# Patient Record
Sex: MALE | Race: OTHER | HISPANIC OR LATINO
[De-identification: names, ages, dates, MRNs, and addresses within clinical notes are randomized per-mention and may not be internally consistent; named-entity substitution may affect disease eponyms.]

---

## 2022-03-18 ENCOUNTER — LABORATORY RESULT (OUTPATIENT)
Age: 67
End: 2022-03-18

## 2022-03-18 ENCOUNTER — APPOINTMENT (OUTPATIENT)
Dept: INTERNAL MEDICINE | Facility: CLINIC | Age: 67
End: 2022-03-18
Payer: COMMERCIAL

## 2022-03-18 ENCOUNTER — OUTPATIENT (OUTPATIENT)
Dept: OUTPATIENT SERVICES | Facility: HOSPITAL | Age: 67
LOS: 1 days | End: 2022-03-18
Payer: SELF-PAY

## 2022-03-18 ENCOUNTER — RESULT REVIEW (OUTPATIENT)
Age: 67
End: 2022-03-18

## 2022-03-18 VITALS
BODY MASS INDEX: 27.61 KG/M2 | SYSTOLIC BLOOD PRESSURE: 110 MMHG | DIASTOLIC BLOOD PRESSURE: 70 MMHG | HEIGHT: 70.5 IN | OXYGEN SATURATION: 98 % | HEART RATE: 94 BPM | WEIGHT: 195 LBS

## 2022-03-18 VITALS
BODY MASS INDEX: 27.61 KG/M2 | SYSTOLIC BLOOD PRESSURE: 110 MMHG | HEART RATE: 94 BPM | OXYGEN SATURATION: 98 % | WEIGHT: 195 LBS | HEIGHT: 70.5 IN | DIASTOLIC BLOOD PRESSURE: 70 MMHG

## 2022-03-18 DIAGNOSIS — Z82.49 FAMILY HISTORY OF ISCHEMIC HEART DISEASE AND OTHER DISEASES OF THE CIRCULATORY SYSTEM: ICD-10-CM

## 2022-03-18 DIAGNOSIS — I10 ESSENTIAL (PRIMARY) HYPERTENSION: ICD-10-CM

## 2022-03-18 DIAGNOSIS — Z86.010 PERSONAL HISTORY OF COLONIC POLYPS: ICD-10-CM

## 2022-03-18 DIAGNOSIS — R00.2 PALPITATIONS: ICD-10-CM

## 2022-03-18 DIAGNOSIS — K59.00 CONSTIPATION, UNSPECIFIED: ICD-10-CM

## 2022-03-18 DIAGNOSIS — D17.1 BENIGN LIPOMATOUS NEOPLASM OF SKIN AND SUBCUTANEOUS TISSUE OF TRUNK: ICD-10-CM

## 2022-03-18 LAB
BASOPHILS # BLD AUTO: 0.06 K/UL
BASOPHILS NFR BLD AUTO: 0.8 %
EOSINOPHIL # BLD AUTO: 0.17 K/UL
EOSINOPHIL NFR BLD AUTO: 2.2 %
HCT VFR BLD CALC: 50.2 %
HGB BLD-MCNC: 16.8 G/DL
IMM GRANULOCYTES NFR BLD AUTO: 0.8 %
LYMPHOCYTES # BLD AUTO: 2.51 K/UL
LYMPHOCYTES NFR BLD AUTO: 32.1 %
MAN DIFF?: NORMAL
MCHC RBC-ENTMCNC: 31.5 PG
MCHC RBC-ENTMCNC: 33.5 GM/DL
MCV RBC AUTO: 94 FL
MONOCYTES # BLD AUTO: 0.61 K/UL
MONOCYTES NFR BLD AUTO: 7.8 %
NEUTROPHILS # BLD AUTO: 4.41 K/UL
NEUTROPHILS NFR BLD AUTO: 56.3 %
PLATELET # BLD AUTO: 265 K/UL
RBC # BLD: 5.34 M/UL
RBC # FLD: 13.2 %
WBC # FLD AUTO: 7.82 K/UL

## 2022-03-18 PROCEDURE — G0463: CPT | Mod: 25

## 2022-03-18 PROCEDURE — ZZZZZ: CPT

## 2022-03-18 PROCEDURE — 93005 ELECTROCARDIOGRAM TRACING: CPT

## 2022-03-18 RX ORDER — DOCUSATE SODIUM 100 MG/1
100 CAPSULE ORAL
Qty: 90 | Refills: 0 | Status: ACTIVE | COMMUNITY
Start: 2022-03-18 | End: 1900-01-01

## 2022-03-18 RX ORDER — PANTOPRAZOLE 40 MG/1
40 TABLET, DELAYED RELEASE ORAL
Qty: 35 | Refills: 0 | Status: ACTIVE | COMMUNITY
Start: 2022-03-18 | End: 1900-01-01

## 2022-03-18 RX ORDER — SENNOSIDES 8.6 MG TABLETS 8.6 MG/1
8.6 TABLET ORAL
Qty: 1 | Refills: 0 | Status: ACTIVE | COMMUNITY
Start: 2022-03-18 | End: 1900-01-01

## 2022-03-22 ENCOUNTER — NON-APPOINTMENT (OUTPATIENT)
Age: 67
End: 2022-03-22

## 2022-03-23 DIAGNOSIS — Z86.010 PERSONAL HISTORY OF COLONIC POLYPS: ICD-10-CM

## 2022-03-23 DIAGNOSIS — D17.1 BENIGN LIPOMATOUS NEOPLASM OF SKIN AND SUBCUTANEOUS TISSUE OF TRUNK: ICD-10-CM

## 2022-03-23 DIAGNOSIS — Z82.49 FAMILY HISTORY OF ISCHEMIC HEART DISEASE AND OTHER DISEASES OF THE CIRCULATORY SYSTEM: ICD-10-CM

## 2022-03-23 DIAGNOSIS — R00.2 PALPITATIONS: ICD-10-CM

## 2022-03-23 DIAGNOSIS — R23.2 FLUSHING: ICD-10-CM

## 2022-03-23 DIAGNOSIS — K29.00 ACUTE GASTRITIS WITHOUT BLEEDING: ICD-10-CM

## 2022-03-24 ENCOUNTER — NON-APPOINTMENT (OUTPATIENT)
Age: 67
End: 2022-03-24

## 2022-03-24 DIAGNOSIS — E78.5 HYPERLIPIDEMIA, UNSPECIFIED: ICD-10-CM

## 2022-03-24 LAB
5OH-INDOLEACETATE UR-SCNC: 3 MG/G CREAT
ALBUMIN SERPL ELPH-MCNC: 5.1 G/DL
ALP BLD-CCNC: 79 U/L
ALT SERPL-CCNC: 35 U/L
ANION GAP SERPL CALC-SCNC: 15 MMOL/L
AST SERPL-CCNC: 20 U/L
BILIRUB SERPL-MCNC: 0.3 MG/DL
BUN SERPL-MCNC: 20 MG/DL
CALCIUM SERPL-MCNC: 10.3 MG/DL
CHLORIDE SERPL-SCNC: 103 MMOL/L
CHOLEST SERPL-MCNC: 224 MG/DL
CO2 SERPL-SCNC: 24 MMOL/L
CREAT ?TM UR-MCNC: 120 MG/DL
CREAT SERPL-MCNC: 0.92 MG/DL
EGFR: 92 ML/MIN/1.73M2
ESTIMATED AVERAGE GLUCOSE: 120 MG/DL
GLUCOSE SERPL-MCNC: 81 MG/DL
HBA1C MFR BLD HPLC: 5.8 %
HDLC SERPL-MCNC: 34 MG/DL
LDLC SERPL CALC-MCNC: NORMAL MG/DL
NONHDLC SERPL-MCNC: 190 MG/DL
POTASSIUM SERPL-SCNC: 4.4 MMOL/L
PROT SERPL-MCNC: 7.8 G/DL
SODIUM SERPL-SCNC: 141 MMOL/L
TRIGL SERPL-MCNC: 575 MG/DL

## 2022-03-24 RX ORDER — ATORVASTATIN CALCIUM 20 MG/1
20 TABLET, FILM COATED ORAL
Qty: 90 | Refills: 3 | Status: ACTIVE | COMMUNITY
Start: 2022-03-24 | End: 1900-01-01

## 2022-03-25 RX ORDER — FENOFIBRATE 48 MG/1
48 TABLET ORAL DAILY
Qty: 30 | Refills: 3 | Status: ACTIVE | COMMUNITY
Start: 2022-03-25 | End: 1900-01-01

## 2022-03-25 NOTE — HISTORY OF PRESENT ILLNESS
[Other: ______] : provided by [facility] [Time Spent: ____ minutes] : Total time spent using  services: [unfilled] minutes. The patient's primary language is not English thus required  services. [FreeTextEntry1] : New patient [TWNoteComboBox1] : [place] [de-identified] : 66y M  pmhx HTN presenting having Recently immigrated from [facility] presenting to establish care. \par \par Epigastric pain and diarrhea- Patient with diarrhea and nausea after every meal not attributed to certain foods although occurs every time he eats. No episodes of vomiting. Epigastric Pain. With Blood in stool however with prior dx of hemorrhoids bright red blood. Also with episodes of constipation. Last saw GI doctor in [facility] polyps 2 years ago. Endoscopy in [facility] when patient was younger. No weight loss.  Family inquiring about investigation for H pylori. Diet Breakfast eggs corn tea Lunch rice salads vegetables Dinner Chicken vegetables. \par \par Lumps on Body- Back abdomen lumps with patient sister requesting further evaluation. \par \par Episodic flushing and palpitations- Not specifically atributed to exercise or walking although will happen with stress with transient left chest tingling. Denies current stress stating he is currently working. No previous appointment with cardiologist. Massages chest and chest pain goes away. unclear duration of chest discomfort. \par \par HTN- average 110/70 hx HTN with 147/95 highest family and patient have seen with patient adherent to enalapril. \par \par

## 2022-03-25 NOTE — PLAN
[FreeTextEntry1] : #Acute Gastritis\par -Will send H Pylori antigen\par -Trial of pantoprazole \par -GI referral placed\par \par #Palpitations\par -EKG performed regular rhythm without obvious ectopy\par -May be attributatable to anxiety\par -given constellation of GI upset and palpitations w flushing will also send 5-HIAA urine to r/o carcinoid\par \par #Torso and back lesions\par -likely lipoma\par -US Chest to further characterize lesions\par \par #Mixed diarrhea and constipation\par -senna and colace for times of constipation\par \par #HCM\par -deferred prenvar 20 at this time to have administered at next visit.\par - 6/3/21 J and J\par -Colonoscopy with prior polyps 2 years ago approx in [place] unclear polyp size\par -Received Tetanus 2021\par -Baseline labs including CBC, metabolic panel, lipid panel, a1c\par \par Follow up after gastroenterology appt for further evaluation and management.

## 2022-03-25 NOTE — REVIEW OF SYSTEMS
[Chest Pain] : chest pain [Nausea] : nausea [Constipation] : constipation [Diarrhea] : diarrhea [Heartburn] : heartburn [Fever] : no fever [Chills] : no chills [Fatigue] : no fatigue [Palpitations] : no palpitations [Shortness Of Breath] : no shortness of breath [Cough] : no cough [Abdominal Pain] : no abdominal pain [Vomiting] : no vomiting [FreeTextEntry5] : tingling chest discomfort [FreeTextEntry7] : BRBPR

## 2022-03-25 NOTE — PHYSICAL EXAM
[No Acute Distress] : no acute distress [Well Nourished] : well nourished [No Respiratory Distress] : no respiratory distress  [No Accessory Muscle Use] : no accessory muscle use [Normal Rate] : normal rate  [Regular Rhythm] : with a regular rhythm [Soft] : abdomen soft [Non Tender] : non-tender [No HSM] : no HSM [Normal Bowel Sounds] : normal bowel sounds [Clear to Auscultation] : lungs were clear to auscultation bilaterally [No Murmur] : no murmur heard [No Joint Swelling] : no joint swelling [Grossly Normal Strength/Tone] : grossly normal strength/tone [Normal Gait] : normal gait [Normal Affect] : the affect was normal [Normal Insight/Judgement] : insight and judgment were intact [de-identified] : multiple lesions along back side and abdomen, circular nonerythematous mobile.

## 2022-03-25 NOTE — ASSESSMENT
[FreeTextEntry1] : 66y M  pmhx HTN presenting having Recently immigrated from [facility] presenting to establish care presenting with acute gastritis possible GERD and will evaluate for h pylori, multiple soft tissue lesions likely lipomas, and hx of polyps as well as episodes of palpitations and flushing.

## 2022-03-29 LAB — H PYLORI AG STL QL: DETECTED

## 2022-03-29 RX ORDER — AMOXICILLIN 500 MG/1
500 TABLET, FILM COATED ORAL TWICE DAILY
Qty: 56 | Refills: 0 | Status: ACTIVE | COMMUNITY
Start: 2022-03-29 | End: 1900-01-01

## 2022-03-29 RX ORDER — CLARITHROMYCIN 500 MG/1
500 TABLET, FILM COATED ORAL
Qty: 28 | Refills: 0 | Status: ACTIVE | COMMUNITY
Start: 2022-03-29 | End: 1900-01-01

## 2022-04-08 ENCOUNTER — APPOINTMENT (OUTPATIENT)
Dept: ULTRASOUND IMAGING | Facility: IMAGING CENTER | Age: 67
End: 2022-04-08
Payer: COMMERCIAL

## 2022-04-08 ENCOUNTER — OUTPATIENT (OUTPATIENT)
Dept: OUTPATIENT SERVICES | Facility: HOSPITAL | Age: 67
LOS: 1 days | End: 2022-04-08
Payer: SELF-PAY

## 2022-04-08 DIAGNOSIS — D17.1 BENIGN LIPOMATOUS NEOPLASM OF SKIN AND SUBCUTANEOUS TISSUE OF TRUNK: ICD-10-CM

## 2022-04-08 PROCEDURE — 76604 US EXAM CHEST: CPT

## 2022-04-08 PROCEDURE — 76604 US EXAM CHEST: CPT | Mod: 26

## 2022-04-15 ENCOUNTER — NON-APPOINTMENT (OUTPATIENT)
Age: 67
End: 2022-04-15

## 2022-04-18 ENCOUNTER — OUTPATIENT (OUTPATIENT)
Dept: OUTPATIENT SERVICES | Facility: HOSPITAL | Age: 67
LOS: 1 days | End: 2022-04-18
Payer: SELF-PAY

## 2022-04-18 ENCOUNTER — APPOINTMENT (OUTPATIENT)
Dept: INTERNAL MEDICINE | Facility: CLINIC | Age: 67
End: 2022-04-18
Payer: COMMERCIAL

## 2022-04-18 ENCOUNTER — NON-APPOINTMENT (OUTPATIENT)
Age: 67
End: 2022-04-18

## 2022-04-18 VITALS
OXYGEN SATURATION: 97 % | WEIGHT: 195 LBS | SYSTOLIC BLOOD PRESSURE: 110 MMHG | HEART RATE: 98 BPM | BODY MASS INDEX: 27.61 KG/M2 | DIASTOLIC BLOOD PRESSURE: 78 MMHG | HEIGHT: 70.5 IN

## 2022-04-18 DIAGNOSIS — I10 ESSENTIAL (PRIMARY) HYPERTENSION: ICD-10-CM

## 2022-04-18 DIAGNOSIS — R23.2 FLUSHING: ICD-10-CM

## 2022-04-18 DIAGNOSIS — A04.8 OTHER SPECIFIED BACTERIAL INTESTINAL INFECTIONS: ICD-10-CM

## 2022-04-18 DIAGNOSIS — R06.83 SNORING: ICD-10-CM

## 2022-04-18 DIAGNOSIS — K29.00 ACUTE GASTRITIS W/OUT BLEEDING: ICD-10-CM

## 2022-04-18 DIAGNOSIS — Z00.00 ENCOUNTER FOR GENERAL ADULT MEDICAL EXAMINATION W/OUT ABNORMAL FINDINGS: ICD-10-CM

## 2022-04-18 PROCEDURE — G0463: CPT

## 2022-04-18 PROCEDURE — ZZZZZ: CPT

## 2022-04-19 PROBLEM — R23.2 FLUSHING: Status: ACTIVE | Noted: 2022-03-18

## 2022-04-19 PROBLEM — A04.8 HELICOBACTER PYLORI (H. PYLORI) INFECTION: Status: ACTIVE | Noted: 2022-03-29

## 2022-04-19 PROBLEM — K29.00 ACUTE GASTRITIS: Status: ACTIVE | Noted: 2022-03-18

## 2022-04-19 PROBLEM — Z00.00 ENCOUNTER FOR PREVENTIVE HEALTH EXAMINATION: Status: ACTIVE | Noted: 2022-03-17

## 2022-04-19 NOTE — PLAN
[FreeTextEntry1] : #Acute gastritis and H pylori\par -symptoms resolved. \par -will provide sample for test of cure. order placed H pylori stool antigen\par \par #Atypical chest pain\par -reinforced importance of adherence to thallium stress test as referred previous. \par -likely MSK but thorough eval warranted to exclude more sinister etx\par -continue to monitor sx with low threshold to return to clinic or hospital if sx worsen\par \par #Flushing\par -previous HIAA negative\par -Flushing can present as adrenergic response in DIEGO \par -patient notes prior hx of deviated septum and snoring as well as sleep study in [facility] with poor result does not use o2 or bilevel or cpap\par -will refer to sleep study

## 2022-04-19 NOTE — HISTORY OF PRESENT ILLNESS
[Other: ______] : provided by ERUM [Time Spent: ____ minutes] : Total time spent using  services: [unfilled] minutes. The patient's primary language is not English thus required  services. [FreeTextEntry1] : Follow up  [TWNoteComboBox1] : [place] [de-identified] : 66y M pmhx HTN, lipomas, polyps, acute gastritis w h pylori+ sp eradication therapy pending HIMA, episodic palpitation and flushing, and atypical chest pain presenting for follow up. \par \par Acute gastritis- now status post eradication therapy with no further gastritis symptoms. Will perform test of cure and return to lab. \par \par Episodic palpitation and flushing- With episodes lasting minutes occurring during both day and night. Not necessarily associated with anxiety. No n/v assoc. Has been occurring for months to years. Frequency multiple times per week. \par \par atypical chest pain-with a one year hx of atypical chest pain inferior to L breast without radiation to arm or to neck. no assoc CH or SOB. not exacerbated w exertion. no nv. No decreased exercise tolerance although sedentary lifestyle. Without hx of trauma or other insult to area. Described as a pressure and "" feeling. Previously referred for thallium stress test has not had test performed. Denies previous cardiac hx or workup other than ekg performed on last presentation in clinic. \par \par Diarrhea and Constipation- now resolved. 2 BM per day soft without blood in stool however when straining with occasional blood on paper. \par \par Refuses Prevnar vaccine.

## 2022-04-19 NOTE — PHYSICAL EXAM
[No Acute Distress] : no acute distress [Well Nourished] : well nourished [No Respiratory Distress] : no respiratory distress  [No Accessory Muscle Use] : no accessory muscle use [Clear to Auscultation] : lungs were clear to auscultation bilaterally [Normal Rate] : normal rate  [Regular Rhythm] : with a regular rhythm [No Murmur] : no murmur heard [Soft] : abdomen soft [Non Tender] : non-tender [No HSM] : no HSM [Normal Bowel Sounds] : normal bowel sounds [No Joint Swelling] : no joint swelling [Grossly Normal Strength/Tone] : grossly normal strength/tone [Normal Gait] : normal gait [Normal Affect] : the affect was normal [Normal Insight/Judgement] : insight and judgment were intact [de-identified] : multiple lesions along back side and abdomen, circular nonerythematous mobile.

## 2022-04-19 NOTE — ASSESSMENT
[FreeTextEntry1] : 66y M pmhx HTN, lipomas, polyps, acute gastritis w h pylori+ sp eradication therapy pending HIMA, episodic palpitation and flushing, and atypical chest pain presenting for follow up.

## 2022-04-19 NOTE — REVIEW OF SYSTEMS
[Chest Pain] : chest pain [Fever] : no fever [Chills] : no chills [Fatigue] : no fatigue [Palpitations] : no palpitations [Shortness Of Breath] : no shortness of breath [Cough] : no cough [Abdominal Pain] : no abdominal pain [Nausea] : no nausea [Constipation] : no constipation [Diarrhea] : no diarrhea [Vomiting] : no vomiting [Heartburn] : no heartburn [FreeTextEntry5] : L chest discomfort

## 2022-04-20 ENCOUNTER — TRANSCRIPTION ENCOUNTER (OUTPATIENT)
Age: 67
End: 2022-04-20

## 2022-04-20 DIAGNOSIS — A04.8 OTHER SPECIFIED BACTERIAL INTESTINAL INFECTIONS: ICD-10-CM

## 2022-04-20 DIAGNOSIS — R23.2 FLUSHING: ICD-10-CM

## 2022-04-20 DIAGNOSIS — K29.00 ACUTE GASTRITIS WITHOUT BLEEDING: ICD-10-CM

## 2022-04-20 DIAGNOSIS — R06.83 SNORING: ICD-10-CM

## 2022-04-21 ENCOUNTER — TRANSCRIPTION ENCOUNTER (OUTPATIENT)
Age: 67
End: 2022-04-21

## 2022-04-26 ENCOUNTER — TRANSCRIPTION ENCOUNTER (OUTPATIENT)
Age: 67
End: 2022-04-26

## 2022-05-06 ENCOUNTER — TRANSCRIPTION ENCOUNTER (OUTPATIENT)
Age: 67
End: 2022-05-06

## 2022-05-24 ENCOUNTER — TRANSCRIPTION ENCOUNTER (OUTPATIENT)
Age: 67
End: 2022-05-24

## 2022-05-27 ENCOUNTER — TRANSCRIPTION ENCOUNTER (OUTPATIENT)
Age: 67
End: 2022-05-27

## 2022-09-20 ENCOUNTER — APPOINTMENT (OUTPATIENT)
Dept: GASTROENTEROLOGY | Facility: HOSPITAL | Age: 67
End: 2022-09-20